# Patient Record
Sex: MALE | Race: WHITE | URBAN - METROPOLITAN AREA
[De-identification: names, ages, dates, MRNs, and addresses within clinical notes are randomized per-mention and may not be internally consistent; named-entity substitution may affect disease eponyms.]

---

## 2022-10-14 ENCOUNTER — OFFICE VISIT (OUTPATIENT)
Dept: URBAN - METROPOLITAN AREA CLINIC 10 | Facility: CLINIC | Age: 67
End: 2022-10-14
Payer: MEDICARE

## 2022-10-14 DIAGNOSIS — H40.1113 PRIMARY OPEN-ANGLE GLAUCOMA, SEVERE STAGE, RIGHT EYE: Primary | ICD-10-CM

## 2022-10-14 DIAGNOSIS — H40.1122 PRIMARY OPEN-ANGLE GLAUCOMA, MODERATE STAGE, LEFT EYE: ICD-10-CM

## 2022-10-14 DIAGNOSIS — Z96.1 PRESENCE OF INTRAOCULAR LENS: ICD-10-CM

## 2022-10-14 DIAGNOSIS — H26.491 OTHER SECONDARY CATARACT, RIGHT EYE: ICD-10-CM

## 2022-10-14 PROCEDURE — 92020 GONIOSCOPY: CPT | Performed by: STUDENT IN AN ORGANIZED HEALTH CARE EDUCATION/TRAINING PROGRAM

## 2022-10-14 PROCEDURE — 92133 CPTRZD OPH DX IMG PST SGM ON: CPT | Performed by: STUDENT IN AN ORGANIZED HEALTH CARE EDUCATION/TRAINING PROGRAM

## 2022-10-14 PROCEDURE — 99204 OFFICE O/P NEW MOD 45 MIN: CPT | Performed by: STUDENT IN AN ORGANIZED HEALTH CARE EDUCATION/TRAINING PROGRAM

## 2022-10-14 PROCEDURE — 92083 EXTENDED VISUAL FIELD XM: CPT | Performed by: STUDENT IN AN ORGANIZED HEALTH CARE EDUCATION/TRAINING PROGRAM

## 2022-10-14 PROCEDURE — 76514 ECHO EXAM OF EYE THICKNESS: CPT | Performed by: STUDENT IN AN ORGANIZED HEALTH CARE EDUCATION/TRAINING PROGRAM

## 2022-10-14 RX ORDER — PREDNISOLONE ACETATE 10 MG/ML
1 % SUSPENSION/ DROPS OPHTHALMIC
Qty: 5 | Refills: 1 | Status: ACTIVE
Start: 2022-10-14

## 2022-10-14 RX ORDER — NETARSUDIL 0.2 MG/ML
0.02 % SOLUTION/ DROPS OPHTHALMIC; TOPICAL
Qty: 2.5 | Refills: 1 | Status: ACTIVE
Start: 2022-10-14

## 2022-10-14 RX ORDER — OFLOXACIN 3 MG/ML
0.3 % SOLUTION/ DROPS OPHTHALMIC
Qty: 5 | Refills: 1 | Status: ACTIVE
Start: 2022-10-14

## 2022-10-14 ASSESSMENT — KERATOMETRY
OD: 46.00
OS: 42.63

## 2022-10-14 ASSESSMENT — VISUAL ACUITY
OD: HM
OS: 20/20

## 2022-10-14 ASSESSMENT — INTRAOCULAR PRESSURE
OS: 25
OD: 55

## 2022-10-14 NOTE — IMPRESSION/PLAN
Impression: Other secondary cataract, right eye: H26.491. Plan: Significant PCO present. After IOP has stabilized, recommend Yag Capsualotomy.

## 2022-10-14 NOTE — IMPRESSION/PLAN
Impression: Primary open-angle glaucoma, severe stage, right eye: H40.1113. Plan: Ocular Surgical History: s/p Trab OD, PC IOL OU, SLT OU 2019 Pachy: 528/509 Tmax: 55/25 Target IOP: very low teens OU Med Allergies: none Heart / Lung / Kidney disease/sulfa allergy: Pt. denies Gonioscopy: OD grade 4 sup/nasal/temp, grade 3 inf : OS open to  2+ tmp
OCT(10/14/22): OD unable ; OS 89 inf thinning HVF 24-2(10/14/22): MD OD unable ; OS -7 inf/temp/nasal
C/D: 0.95/0.65 Better seeing eye: OS
IOP Today: 55/22 Plan: IOP today is elevated OU. Patient has had Trab OD and SLT OU(2019). Some inflammation present in [facility] OD, will monitor for now. IOP is too high for amount of glaucoma present. Recommend lowering IOP OU to preserve remaining vision. Diode Cyclophotocoagulation is recommended in the RIGHT eye and Omni  viscocanaloplasty and goniotomy recommended for the LEFT eye. Glaucoma and further vision loss from elevated IOP discussed. The Patient is aware that the risks of [address] and Omni include but are not limited to complete blindness, loss of vision and loss of the eye, bleeding, infection, inflammation, Hypotony, Persistent IOP elevation, and orbital or ocular inflammation. The patient is aware that failure to Lower pressure will result in progressive sight loss and possibly blindness. Discussed activity restrictions: no rubbing the eye, no swimming straining or lifting over 10 lbs. All questions answered. Pt. understands and wishes to proceed with CPC OD and Omni OS. See [facility] to schedule CPC OD. POD1 w/ Dansdill, POW1 w/ Optometry, and POW3 w/ Dansdill. Treatment plan:  18 spots, sparing ST quadrant. 4000ms, power titrated to pops. ERx'd Prednisolone QID OD taper. [facility]: Schedule Omni OS: Viscocanaloplasty:  360 degrees/Goniotomy 180 degrees No injectables/Dexycu, Use Oflox/Pred QID]] ERx'd Prednisolone QID, Ketorolac QID, and Ofloxacin QID. Drops: 
Continue: Brim and Grabiel BID OU, Latan QHS OU Start: Rhopressa QHS OS Discussed natural history of glaucoma and that irreversible vision loss can occur without adequate IOP control. Emphasized compliance with eyedrops and other treatment described above.

## 2022-11-28 ENCOUNTER — POST-OPERATIVE VISIT (OUTPATIENT)
Dept: URBAN - METROPOLITAN AREA CLINIC 10 | Facility: CLINIC | Age: 67
End: 2022-11-28
Payer: MEDICARE

## 2022-11-28 DIAGNOSIS — H40.1113 PRIMARY OPEN-ANGLE GLAUCOMA, SEVERE STAGE, RIGHT EYE: Primary | ICD-10-CM

## 2022-11-28 PROCEDURE — 99024 POSTOP FOLLOW-UP VISIT: CPT | Performed by: STUDENT IN AN ORGANIZED HEALTH CARE EDUCATION/TRAINING PROGRAM

## 2022-11-28 RX ORDER — NETARSUDIL 0.2 MG/ML
0.02 % SOLUTION/ DROPS OPHTHALMIC; TOPICAL
Qty: 2.5 | Refills: 1 | Status: ACTIVE
Start: 2022-11-28

## 2022-11-28 ASSESSMENT — INTRAOCULAR PRESSURE
OS: 20
OD: 19

## 2022-11-28 NOTE — IMPRESSION/PLAN
Impression: Primary open-angle glaucoma, severe stage, right eye: H40.1113. Plan: Ocular Surgical History: s/p Trab OD, PC IOL OU, SLT OU 2019 Pachy: 528/509 Tmax: 55/25 Target IOP: very low teens OU Med Allergies: none Heart / Lung / Kidney disease/sulfa allergy: Pt. denies Gonioscopy: OD grade 4 sup/nasal/temp, grade 3 inf : OS open to  2+ tmp
OCT(10/14/22): OD unable ; OS 89 inf thinning HVF 24-2(10/14/22): MD OD unable ; OS -7 inf/temp/nasal
C/D: 0.95/0.7 Better seeing eye: OS
IOP Today: 19/20 Plan: s/p Diode OD
-IOP is doing much better OU.
- Patient doing well. No activity restrictions. - Drop Instructions: Prednisolone TID x 1wk, BID x 1wk, QD x 1wk, then stop. RTC as scheduled Drops: 
Continue: Brim and Grabiel BID OU, Latan QHS OU, Rhopressa QHS OS Discussed natural history of glaucoma and that irreversible vision loss can occur without adequate IOP control. Emphasized compliance with eyedrops and other treatment described above.

## 2022-12-05 ENCOUNTER — SURGERY (OUTPATIENT)
Dept: URBAN - METROPOLITAN AREA SURGERY 5 | Facility: SURGERY | Age: 67
End: 2022-12-05
Payer: MEDICARE

## 2022-12-05 DIAGNOSIS — H40.1122 PRIMARY OPEN-ANGLE GLAUCOMA, LEFT EYE, MODERATE STAGE: Primary | ICD-10-CM
